# Patient Record
Sex: MALE
[De-identification: names, ages, dates, MRNs, and addresses within clinical notes are randomized per-mention and may not be internally consistent; named-entity substitution may affect disease eponyms.]

---

## 2018-05-18 ENCOUNTER — HOSPITAL ENCOUNTER (EMERGENCY)
Dept: HOSPITAL 97 - ER | Age: 33
Discharge: LEFT BEFORE DISCHARGE | End: 2018-05-18
Payer: SELF-PAY

## 2018-05-18 DIAGNOSIS — Z02.9: Primary | ICD-10-CM

## 2018-05-18 NOTE — ER
Nurse's Notes                                                                                     

 [facility]                                                                

Name: Tim Gupta                                                                             

Age: 32 yrs                                                                                       

Sex: Male                                                                                         

: 1985                                                                                   

MRN: [id number]                                                                                   

Arrival Date: 2018                                                                          

Time: 16:48                                                                                       

Account#: [id number]                                                                            

Bed Waiting                                                                                       

Private MD:                                                                                       

Diagnosis:                                                                                        

                                                                                                  

Presentation:                                                                                     

                                                                                             

16:53 Note Patient left prior to triage, stating, "I got the blood to stop, I don't think I   lk1 

      need to be here.".                                                                          

                                                                                                  

ED Course:                                                                                        

16:48 Patient arrived in ED.                                                                  sb2 

                                                                                                  

Administered Medications:                                                                         

No medications were administered                                                                  

                                                                                                  

                                                                                                  

Outcome:                                                                                          

16:53 Patient left the ED.                                                                    lk1 

                                                                                                  

Signatures:                                                                                       

Marita Toussaint RN                         RN   lk1                                                  

Tamanna Braun                               sb2                                                  

                                                                                                  

**************************************************************************************************

## 2018-12-20 ENCOUNTER — HOSPITAL ENCOUNTER (EMERGENCY)
Dept: HOSPITAL 97 - ER | Age: 33
Discharge: HOME | End: 2018-12-20
Payer: SELF-PAY

## 2018-12-20 DIAGNOSIS — I10: ICD-10-CM

## 2018-12-20 DIAGNOSIS — F10.239: Primary | ICD-10-CM

## 2018-12-20 DIAGNOSIS — K21.9: ICD-10-CM

## 2018-12-20 LAB
ALBUMIN SERPL BCP-MCNC: 4.1 G/DL (ref 3.4–5)
ALP SERPL-CCNC: 126 U/L (ref 45–117)
ALT SERPL W P-5'-P-CCNC: 62 U/L (ref 12–78)
AST SERPL W P-5'-P-CCNC: 106 U/L (ref 15–37)
BUN BLD-MCNC: 13 MG/DL (ref 7–18)
GLUCOSE SERPLBLD-MCNC: 107 MG/DL (ref 74–106)
HCT VFR BLD CALC: 47.6 % (ref 39.6–49)
LIPASE SERPL-CCNC: 131 U/L (ref 73–393)
LYMPHOCYTES # SPEC AUTO: 1.4 K/UL (ref 0.7–4.9)
MAGNESIUM SERPL-MCNC: 2.4 MG/DL (ref 1.8–2.4)
MCH RBC QN AUTO: 36 PG (ref 27–35)
MCV RBC: 100.4 FL (ref 80–100)
METHAMPHET UR QL SCN: NEGATIVE
PMV BLD: 8.8 FL (ref 7.6–11.3)
POTASSIUM SERPL-SCNC: 3.8 MMOL/L (ref 3.5–5.1)
RBC # BLD: 4.74 M/UL (ref 4.33–5.43)
THC SERPL-MCNC: NEGATIVE NG/ML
TROPONIN I: < 0.02 NG/ML (ref 0–0.04)

## 2018-12-20 PROCEDURE — 82962 GLUCOSE BLOOD TEST: CPT

## 2018-12-20 PROCEDURE — 80307 DRUG TEST PRSMV CHEM ANLYZR: CPT

## 2018-12-20 PROCEDURE — 96374 THER/PROPH/DIAG INJ IV PUSH: CPT

## 2018-12-20 PROCEDURE — 85025 COMPLETE CBC W/AUTO DIFF WBC: CPT

## 2018-12-20 PROCEDURE — 96375 TX/PRO/DX INJ NEW DRUG ADDON: CPT

## 2018-12-20 PROCEDURE — 84484 ASSAY OF TROPONIN QUANT: CPT

## 2018-12-20 PROCEDURE — 80048 BASIC METABOLIC PNL TOTAL CA: CPT

## 2018-12-20 PROCEDURE — 99285 EMERGENCY DEPT VISIT HI MDM: CPT

## 2018-12-20 PROCEDURE — 80076 HEPATIC FUNCTION PANEL: CPT

## 2018-12-20 PROCEDURE — 93005 ELECTROCARDIOGRAM TRACING: CPT

## 2018-12-20 PROCEDURE — 36415 COLL VENOUS BLD VENIPUNCTURE: CPT

## 2018-12-20 PROCEDURE — 96361 HYDRATE IV INFUSION ADD-ON: CPT

## 2018-12-20 PROCEDURE — 70450 CT HEAD/BRAIN W/O DYE: CPT

## 2018-12-20 PROCEDURE — 81003 URINALYSIS AUTO W/O SCOPE: CPT

## 2018-12-20 PROCEDURE — 83690 ASSAY OF LIPASE: CPT

## 2018-12-20 PROCEDURE — 83735 ASSAY OF MAGNESIUM: CPT

## 2018-12-20 NOTE — ER
Nurse's Notes                                                                                     

 [facility]                                                                

Name: Tim Gupta                                                                             

Age: 33 yrs                                                                                       

Sex: Male                                                                                         

: 1985                                                                                   

MRN: [id number]                                                                                   

Arrival Date: 2018                                                                          

Time: 08:36                                                                                       

Account#: [id number]                                                                            

Bed 7                                                                                             

Private MD: None, None                                                                            

Diagnosis: Alcohol dependence with withdrawal, uncomplicated                                      

                                                                                                  

Presentation:                                                                                     

                                                                                             

08:49 Presenting complaint: Patient states: N/V, headache, and leg cramps since yesterday. Pt hb  

      reports drinking 6-10 beers per day, last drink was yesterday. Transition of care:          

      patient was not received from another setting of care. Onset of symptoms was 2018. Risk Assessment: Do you want to hurt yourself or someone else? Patient            

      reports no desire to harm self or others. Care prior to arrival: None.                      

08:49 Method Of Arrival: Ambulatory                                                           hb  

08:49 Acuity: ROB 3                                                                           hb  

08:51 Initial Sepsis Screen: Does the patient meet any 2 criteria? No. Patient's initial      tw2 

      sepsis screen is negative. Does the patient have a suspected source of infection? No.       

      Patient's initial sepsis screen is negative.                                                

                                                                                                  

Historical:                                                                                       

- Allergies:                                                                                      

08:50 No Known Allergies;                                                                     tw2 

08:50 No Known Allergies;                                                                     hb  

- Home Meds:                                                                                      

08:50 ranitidine HCl 150 mg Oral cap 1 cap once daily [Active]; paroxetine HCl 20 mg Oral tab tw2 

      1 tab once daily [Active]; omeprazole 20 mg Oral cpDR 1 cap once daily [Active];            

      lisinopril-hydrochlorothiazide 10-12.5 mg Oral tab 1 tab once daily [Active];               

08:50 lisinopril-hydrochlorothiazide 10-12.5 mg Oral tab 1 tab once daily [Active];           hb  

      omeprazole 20 mg Oral cpDR 1 cap once daily [Active]; paroxetine HCl 20 mg Oral tab 1       

      tab once daily [Active]; ranitidine HCl 150 mg Oral cap 1 cap once daily [Active];          

- PMHx:                                                                                           

08:50 GERD; Hypertension;                                                                     tw2 

08:50 GERD; Hypertension;                                                                     hb  

                                                                                                  

- Immunization history:: Adult Immunizations Adult Immunizations up to date.                      

- Social history:: Smoking status: Smoking status: Patient/guardian denies using                  

  tobacco.                                                                                        

- Ebola Screening: : Patient denies travel to an Ebola-affected area in the 21 days               

  before illness onset No symptoms or risks identified at this time.                              

                                                                                                  

                                                                                                  

Screenin:49 Abuse screen: Denies threats or abuse. Nutritional screening: No deficits noted.        tw2 

      Tuberculosis screening: No symptoms or risk factors identified. Fall Risk None              

      identified.                                                                                 

                                                                                                  

Assessment:                                                                                       

08:50 General: Appears in no apparent distress. Behavior is cooperative, appropriate for age. tw2 

      Pain: Denies pain. Neuro: Level of Consciousness is awake, alert, obeys commands,           

      Oriented to person, place, time, situation. Cardiovascular: Reports diaphoresis,            

      lightheadedness, dizziness Denies chest pain, shortness of breath, Heart tones S1 S2        

      Patient's skin is warm and dry. Respiratory: Airway is patent Respiratory effort is         

      even, unlabored, Respiratory pattern is regular, symmetrical, Breath sounds are clear       

      bilaterally. GI: Abdomen is round non-distended, Bowel sounds present X 4 quads.            

      Reports nausea. : No signs and/or symptoms were reported regarding the genitourinary      

      system. EENT: No signs and/or symptoms were reported regarding the EENT system. Derm:       

      Skin is diaphoretic. Musculoskeletal: Circulation, motion, and sensation intact. Range      

      of motion: intact in all extremities.                                                       

09:50 Reassessment: Patient appears in no apparent distress at this time. No changes from     tw2 

      previously documented assessment. Patient and/or family updated on plan of care and         

      expected duration. Pain level reassessed. Patient is alert, oriented x 3, equal             

      unlabored respirations, skin warm/dry/pink.                                                 

10:22 Reassessment: Patient appears in no apparent distress at this time. No changes from     tw2 

      previously documented assessment. Patient and/or family updated on plan of care and         

      expected duration. Pain level reassessed. Patient is alert, oriented x 3, equal             

      unlabored respirations, skin warm/dry/pink.                                                 

11:30 Reassessment: Patient appears in no apparent distress at this time. Patient and/or      tw2 

      family updated on plan of care and expected duration. Pain level reassessed. Patient is     

      alert, oriented x 3, equal unlabored respirations, skin warm/dry/pink.                      

12:30 Reassessment: Patient appears in no apparent distress at this time. No changes from     tw2 

      previously documented assessment. Patient and/or family updated on plan of care and         

      expected duration. Pain level reassessed. Patient is alert, oriented x 3, equal             

      unlabored respirations, skin warm/dry/pink.                                                 

13:30 Reassessment: Patient appears in no apparent distress at this time. Patient and/or      tw2 

      family updated on plan of care and expected duration. Pain level reassessed. Patient is     

      alert, oriented x 3, equal unlabored respirations, skin warm/dry/pink.                      

14:00 Reassessment: Patient appears in no apparent distress at this time. Patient and/or      tw2 

      family updated on plan of care and expected duration. Pain level reassessed. Patient is     

      alert, oriented x 3, equal unlabored respirations, skin warm/dry/pink.                      

                                                                                                  

Vital Signs:                                                                                      

08:48  / 101; Pulse 92; Resp 18; Temp 97.2; Pulse Ox 96% on R/A; Pain 5/10;             hb  

09:21  / 90; Pulse 85; Resp 19; Pulse Ox 98% on R/A;                                    tw2 

09:34  / 95 LA Supine (auto/lg); Pulse 85; Resp 19; Pulse Ox 99% ;                      ag  

09:34  / 22 LA Sitting (auto/lg); Pulse 88; Resp 14; Pulse Ox 98% on R/A;               ag  

09:34  / 113 LA Standing (auto/lg); Pulse 103; Resp 16; Pulse Ox 100% ;                 ag  

10:22  / 95; Pulse 90; Resp 17; Pulse Ox 100% on R/A;                                   tw2 

11:30  / 90; Pulse 93; Resp 18; Pulse Ox 100% on R/A;                                   tw2 

12:30  / 86; Pulse 87; Resp 17; Pulse Ox 99% on R/A;                                    tw2 

13:30  / 89; Pulse 90; Resp 17; Pulse Ox 99% on R/A; Pain 0/10;                         tw2 

                                                                                                  

ED Course:                                                                                        

08:36 Patient arrived in ED.                                                                  sb2 

08:36 None, None is Private Physician.                                                        sb2 

08:49 Irina Carranza, RN is Primary Nurse.                                                        tw2 

08:50 Triage completed.                                                                       hb  

08:50 Bed in low position. Call light in reach. Adult w/ patient. Pulse ox on. NIBP on.       tw2 

08:50 Arm band placed on right wrist.                                                         hb  

08:51 Arm band placed on.                                                                     tw2 

08:58 Baltazar Melo PA is PHCP.                                                                cp  

08:58 Yang Naylor MD is Attending Physician.                                              cp  

09:15 EKG done, by EKG tech. reviewed by Baltazar SAMAYOA.                                       at1 

09:15 Inserted saline lock: 20 gauge in right antecubital area, using aseptic technique.      tw2 

      Blood collected.                                                                            

11:07 CT completed. Patient tolerated procedure well. Patient moved to CT via wheelchair.     [id number] 

      Patient moved back from CT.                                                                 

11:11 CT Head Brain wo Cont In Process Unspecified.                                           EDMS

14:00 No provider procedures requiring assistance completed. IV discontinued, intact,         tw2 

      bleeding controlled, No redness/swelling at site. Pressure dressing applied.                

                                                                                                  

Administered Medications:                                                                         

09:18 Drug: Zofran 4 mg Route: IVP; Site: right antecubital;                                  tw2 

10:30 Follow up: Response: No adverse reaction; Nausea is decreased                           tw2 

09:20 Drug: Pepcid 20 mg Route: IVP; Site: right antecubital;                                 tw2 

10:45 Follow up: Response: No adverse reaction                                                tw2 

09:22 Drug: NS 0.9% 1000 ml Route: IV; Rate: 1 bolus; Site: right antecubital;                tw2 

11:30 Follow up: Response: No adverse reaction; IV Status: Completed infusion; IV Intake:     tw2 

      1000ml                                                                                      

12:47 Drug: Librium - chlordiazePOXIDE 50 mg Route: PO;                                       sg  

13:30 Follow up: Response: No adverse reaction                                                tw2 

                                                                                                  

                                                                                                  

Point of Care Testing:                                                                            

      Blood Glucose:                                                                              

09:17 Blood Glucose: 105 mg/dL;                                                               tw2 

      Ranges:                                                                                     

                                                                                                  

Intake:                                                                                           

11:30 IV: 1000ml; Total: 1000ml.                                                              tw2 

                                                                                                  

Outcome:                                                                                          

12:13 Discharge ordered by MD.                                                                cp  

14:00 Patient left the ED.                                                                    tw2 

14:00 Discharged to home ambulatory.                                                          tw2 

14:00 Condition: stable                                                                           

14:00 Discharge instructions given to patient, family, Instructed on discharge instructions,      

      follow up and referral plans. medication usage, Demonstrated understanding of               

      instructions, follow-up care, medications, Prescriptions given X 1.                         

                                                                                                  

Signatures:                                                                                       

Dispatcher [employer]                           Brian Gurrola, YAYO ZEE   sg                                                   

Salma Kennedy, EKG Tech              EKG Tat1                                                  

Teresita Dee Corey, PA PA   cp                                                   

Araceli Reyna RN RN                                                      

Irina Carranza RN                          RN   tw2                                                  

Tamanna Braun                               sb2                                                  

Ifeoma Armendariz                              jg6                                                  

                                                                                                  

Corrections: (The following items were deleted from the chart)                                    

12:26 08:49 Presenting complaint: Patient states: N/V, headache, and leg cramps since         hb  

      yesterday. Pt reports drinking 6-10 beers pre day, last drink was yesterday hb              

                                                                                                  

**************************************************************************************************

## 2018-12-20 NOTE — EDPHYS
Physician Documentation                                                                           

 [facility]                                                                

Name: Tim Gupta                                                                             

Age: 33 yrs                                                                                       

Sex: Male                                                                                         

: 1985                                                                                   

MRN: [id number]                                                                                   

Arrival Date: 2018                                                                          

Time: 08:36                                                                                       

Account#: [id number]                                                                            

Bed 7                                                                                             

Private MD: None, None                                                                            

ED Physician Yang Naylor                                                                       

HPI:                                                                                              

                                                                                             

09:10 This 33 yrs old  Male presents to ER via Ambulatory with complaints of Fever,   cp  

      Vomiting.                                                                                   

09:10 The patient reports fever, not measured (subjective).                                   cp  

09:10 Associated signs and symptoms: Pertinent positives: nausea, vomiting, Pertinent         cp  

      negatives: abdominal pain, chest pain, hemoptysis, skin rash.                               

09:10 Onset: The symptoms/episode began/occurred yesterday.                                   cp  

09:10 Patient reports to heavier use of alcohol over the past month and reports consuming     cp  

      approximately 6-10 beers a day. Reports last use of alcohol was yesterday.                  

                                                                                                  

Historical:                                                                                       

- Allergies:                                                                                      

08:50 No Known Allergies;                                                                     tw2 

08:50 No Known Allergies;                                                                     hb  

- Home Meds:                                                                                      

08:50 ranitidine HCl 150 mg Oral cap 1 cap once daily [Active]; paroxetine HCl 20 mg Oral tab tw2 

      1 tab once daily [Active]; omeprazole 20 mg Oral cpDR 1 cap once daily [Active];            

      lisinopril-hydrochlorothiazide 10-12.5 mg Oral tab 1 tab once daily [Active];               

08:50 lisinopril-hydrochlorothiazide 10-12.5 mg Oral tab 1 tab once daily [Active];           hb  

      omeprazole 20 mg Oral cpDR 1 cap once daily [Active]; paroxetine HCl 20 mg Oral tab 1       

      tab once daily [Active]; ranitidine HCl 150 mg Oral cap 1 cap once daily [Active];          

- PMHx:                                                                                           

08:50 GERD; Hypertension;                                                                     tw2 

08:50 GERD; Hypertension;                                                                     hb  

                                                                                                  

- Immunization history:: Adult Immunizations Adult Immunizations up to date.                      

- Social history:: Smoking status: Smoking status: Patient/guardian denies using                  

  tobacco.                                                                                        

- Ebola Screening: : Patient denies travel to an Ebola-affected area in the 21 days               

  before illness onset No symptoms or risks identified at this time.                              

                                                                                                  

                                                                                                  

ROS:                                                                                              

09:13 Constitutional: Positive for poor PO intake, Negative for fever.                        cp  

09:13 Eyes: Negative for injury, pain, redness, and discharge.                                cp  

09:13 ENT: Negative for drainage from ear(s), ear pain, sore throat, difficulty swallowing,       

      difficulty handling secretions.                                                             

09:13 Cardiovascular: Negative for chest pain, edema, palpitations.                               

09:13 Respiratory: Negative for cough, shortness of breath, wheezing.                             

09:13 Abdomen/GI: Positive for nausea and vomiting, Negative for abdominal pain, diarrhea,        

      constipation, black/tarry stool, rectal bleeding.                                           

09:13 Back: Negative for pain at rest, pain with movement, radiated pain.                         

09:13 : Negative for urinary symptoms, testicular pain                                          

09:13 Skin: Positive for diaphoresis, Negative for cellulitis, rash.                              

09:13 Neuro: Positive for dizziness, tremor, general weakness, Negative for altered mental        

      status, headache, seizure activity, syncope.                                                

09:13 Psych: Positive for alcohol dependence, Negative for depression, drug dependence,           

      auditory hallucinations, visual hallucinations, suicide gesture, suicidal ideation.         

09:13 All other systems are negative.                                                             

                                                                                                  

Exam:                                                                                             

09:15 ECG was reviewed by the Attending Physician.                                            cp  

09:20 Constitutional: The patient appears in no acute distress, alert, awake, non-toxic, well cp  

      developed, well nourished, diaphoretic.                                                     

09:20 Head/Face:  Normocephalic, atraumatic.                                                  cp  

09:20 Eyes: Periorbital structures: appear normal, Pupils: equal, round, and reactive to          

      light and accomodation, Extraocular movements: intact throughout, Conjunctiva: normal,      

      no exudate, no injection, Sclera: no appreciated abnormality, Lids and lashes: appear       

      normal, bilaterally.                                                                        

09:20 ENT: External ear(s): are unremarkable, Ear canal(s): are normal, clear, TM's: bulging,     

      is not appreciated, bilaterally, dullness, bilaterally, erythema, is not appreciated,       

      bilaterally, Nose: is normal, Mouth: Lips: moist, Oral mucosa: pink and intact, moist,      

      Posterior pharynx: is normal, airway is patent, no erythema, no exudate, Voice: is          

      normal.                                                                                     

09:20 Neck: ROM/movement: is normal, is supple, without pain, no range of motions                 

      limitations, no meningismus, no nuchal rigidity.                                            

09:20 Chest/axilla: Inspection: normal, Palpation: is normal, no crepitus, no tenderness.         

09:20 Cardiovascular: Rate: normal, Rhythm: regular, Heart sounds: murmur, not appreciated,       

      Edema: is not appreciated, JVD: is not appreciated.                                         

09:20 Respiratory: the patient does not display signs of respiratory distress,  Respirations:     

      normal, no use of accessory muscles, no retractions, no splinting, no tachypnea,            

      labored breathing, is not present, Breath sounds: are clear throughout, no decreased        

      breath sounds, no stridor, no wheezing.                                                     

09:20 Abdomen/GI: Inspection: abdomen appears normal, Bowel sounds: active, all quadrants,        

      Palpation: abdomen is soft and non-tender, in all quadrants, rebound tenderness, is not     

      appreciated, voluntary guarding, is not appreciated, involuntary guarding, is not           

      appreciated.                                                                                

09:20 Back: pain, is absent, ROM is normal.                                                       

09:20 Skin: cellulitis, is not appreciated, no rash present.                                      

09:20 Neuro: Orientation: to person, place \T\ time. Mentation: is normal, Cerebellar function:   

      is grossly normal, Motor: moves all fours, strength is normal, Sensation: is normal,        

      Abnormal movements: resting tremor, is located in the  right hand and left hand.            

09:20 Psych: Patient has no thoughts/intents to harm self or others. Judgement / Insight is       

      normal. Delusions/hallucinations are not present.                                           

                                                                                                  

Vital Signs:                                                                                      

08:48  / 101; Pulse 92; Resp 18; Temp 97.2; Pulse Ox 96% on R/A; Pain 5/10;             hb  

09:21  / 90; Pulse 85; Resp 19; Pulse Ox 98% on R/A;                                    tw2 

09:34  / 95 LA Supine (auto/lg); Pulse 85; Resp 19; Pulse Ox 99% ;                      ag  

09:34  / 22 LA Sitting (auto/lg); Pulse 88; Resp 14; Pulse Ox 98% on R/A;               ag  

09:34  / 113 LA Standing (auto/lg); Pulse 103; Resp 16; Pulse Ox 100% ;                 ag  

10:22  / 95; Pulse 90; Resp 17; Pulse Ox 100% on R/A;                                   tw2 

11:30  / 90; Pulse 93; Resp 18; Pulse Ox 100% on R/A;                                   tw2 

12:30  / 86; Pulse 87; Resp 17; Pulse Ox 99% on R/A;                                    tw2 

13:30  / 89; Pulse 90; Resp 17; Pulse Ox 99% on R/A; Pain 0/10;                         tw2 

                                                                                                  

MDM:                                                                                              

08:58 Patient medically screened.                                                             cp  

09:30 Differential diagnosis: bronchitis, pneumonia UTI, gastroenteritis, alcohol withdrawal, cp  

      electrolyte abnormality, cardiac arrythmia.                                                 

12:10 Data reviewed: vital signs, nurses notes, lab test result(s), EKG, radiologic studies,  cp  

      CT scan, plain films.                                                                       

12:10 Test interpretation: by ED physician or midlevel provider: ECG, plain radiologic        cp  

      studies. Counseling: I had a detailed discussion with the patient and/or guardian           

      regarding: the historical points, exam findings, and any diagnostic results supporting      

      the discharge/admit diagnosis, lab results, radiology results, the need for outpatient      

      follow up, a family practitioner, to return to the emergency department if symptoms         

      worsen or persist or if there are any questions or concerns that arise at home.             

      Response to treatment: the patient's symptoms have markedly improved after treatment,       

      and as a result, I will discharge patient.                                                  

                                                                                                  

                                                                                             

09:07 Order name: Basic Metabolic Panel; Complete Time: 10:39                                   

                                                                                             

10:40 Interpretation: Normal except: ; GLUC 107.                                          

                                                                                             

09:07 Order name: CBC with Diff; Complete Time: 10:39                                           

                                                                                             

10:40 Interpretation: Normal except: .4; MCH 36.0; RDW 11.8.                             

                                                                                             

09:07 Order name: Creatinine for Radiology; Complete Time: 10:39                                

                                                                                             

09:07 Order name: Hepatic Function; Complete Time: 10:39                                        

                                                                                             

10:40 Interpretation: Normal except: ; ; GLOB 4.1; A/G 1.0.                       

                                                                                             

09:07 Order name: Lipase; Complete Time: 10:40                                                  

                                                                                             

09:07 Order name: Magnesium; Complete Time: 10:40                                               

                                                                                             

09:07 Order name: Troponin I; Complete Time: 10:40                                              

                                                                                             

09:07 Order name: UDS; Complete Time: 10:51                                                     

                                                                                             

10:51 Interpretation: Reviewed.                                                                 

                                                                                             

10:02 Order name: Urine Dipstick--Ancillary (enter results); Complete Time: 13:04               

                                                                                             

10:50 Order name: CT Head Brain wo Cont; Complete Time: 11:37                                 cp  

                                                                                             

11:37 Interpretation: Report reviewed.                                                        cp  

                                                                                             

09:05 Order name: EKG; Complete Time: 09:05                                                   tw2 

                                                                                             

09:05 Order name: EKG - Nurse/Tech; Complete Time: 09:09                                      tw2 

                                                                                             

09:07 Order name: Orthostatics; Complete Time: 09:28                                          cp  

                                                                                             

09:07 Order name: IV Saline Lock; Complete Time: :                                        cp  

                                                                                             

09:07 Order name: Labs collected and sent; Complete Time: :                               cp  

                                                                                                  

EC:15 Rate is 89 beats/min. Rhythm is regular. VT interval is normal. QRS interval is normal. cp  

      QT interval is normal. Interpreted by me. Reviewed by me.                                   

                                                                                                  

Administered Medications:                                                                         

:18 Drug: Zofran 4 mg Route: IVP; Site: right antecubital;                                  tw2 

10:30 Follow up: Response: No adverse reaction; Nausea is decreased                           tw2 

09:20 Drug: Pepcid 20 mg Route: IVP; Site: right antecubital;                                 tw2 

10:45 Follow up: Response: No adverse reaction                                                tw2 

09:22 Drug: NS 0.9% 1000 ml Route: IV; Rate: 1 bolus; Site: right antecubital;                tw2 

11:30 Follow up: Response: No adverse reaction; IV Status: Completed infusion; IV Intake:     tw2 

      1000ml                                                                                      

12:47 Drug: Librium - chlordiazePOXIDE 50 mg Route: PO;                                       sg  

13:30 Follow up: Response: No adverse reaction                                                tw2 

                                                                                                  

                                                                                                  

Point of Care Testing:                                                                            

      Blood Glucose:                                                                              

:17 Blood Glucose: 105 mg/dL;                                                               tw2 

      Ranges:                                                                                     

      Critical Glucose Levels:Adult <50 mg/dl or >400 mg/dl  <40 mg/dl or >180 mg/dl       

Disposition:                                                                                      

18 12:13 Discharged to Home. Impression: Alcohol dependence with withdrawal,                

  uncomplicated.                                                                                  

- Condition is Stable.                                                                            

- Discharge Instructions: Finding Treatment for Addiction, Alcohol Withdrawal, Alcohol            

  Use Disorder, Alcohol Abuse and Nutrition.                                                      

- Prescriptions for chlordiazepoxide HCl 25 mg Oral capsule - take 1 capsule by ORAL              

  route 4 times per day As needed for withdrawal from alcohol; 30 capsule.                        

- Medication Reconciliation Form, Thank You Letter, Antibiotic Education, Prescription            

  Opioid Use, Work release form form.                                                             

- Follow up: Private Physician; When: 1 - 2 days; Reason: Recheck today's complaints.             

- Problem is new.                                                                                 

- Symptoms have improved.                                                                         

                                                                                                  

                                                                                                  

                                                                                                  

Signatures:                                                                                       

Dispatcher [employer]                           EDBrian Cox RN                         RN   Baltazar Chaney PA PA   cp                                                   

Araceli Reyna RN                     RN                                                      

Irina Carranza RN                          RN   tw2                                                  

                                                                                                  

Corrections: (The following items were deleted from the chart)                                    

14:00 12:13 2018 12:13 Discharged to Home. Impression: Alcohol dependence with          tw2 

      withdrawal, uncomplicated. Condition is Stable. Discharge Instructions: Alcohol             

      Withdrawal, Alcohol Abuse and Nutrition. Prescriptions for chlordiazepoxide HCl 25 mg       

      Oral capsule - take 1 capsule by ORAL route 4 times per day As needed for withdrawal        

      from alcohol; 30 capsule. and Forms are Work release form, Medication Reconciliation        

      Form, Thank You Letter, Antibiotic Education, Prescription Opioid Use. Follow up:           

      Private Physician; When: 1 - 2 days; Reason: Recheck today's complaints. Problem is         

      new. Symptoms have improved. cp                                                             

                                                                                                  

**************************************************************************************************

## 2018-12-20 NOTE — RAD REPORT
EXAM DESCRIPTION:  CT - Head Brain Wo Cont - 12/20/2018 11:10 am

 

CLINICAL HISTORY:  Headache

 

COMPARISON:  None.

 

TECHNIQUE:  Computed axial tomography of the head was obtained. IV contrast was not requested.

 

All CT scans are performed using dose optimization technique as appropriate and may include automated
 exposure control or mA/KV adjustment according to patient size.

 

FINDINGS:  An intracranial  bleed is not seen .

 

The ventricles are normal in caliber.

 

No extra-axial fluid collection is noted.

 

Fluid within the sinuses/ mastoids is not seen.

 

IMPRESSION:  No acute intracranial abnormality is seen. If patient's symptoms persist  MRI of the bra
in would be recommended.

## 2018-12-21 NOTE — EKG
Test Date:    2018-12-20               Test Time:    09:08:55

Technician:   DARBY                                     

                                                     

MEASUREMENT RESULTS:                                       

Intervals:                                           

Rate:         89                                     

WA:           152                                    

QRSD:         86                                     

QT:           380                                    

QTc:          462                                    

Axis:                                                

P:            20                                     

WA:           152                                    

QRS:          67                                     

T:            50                                     

                                                     

INTERPRETIVE STATEMENTS:                                       

                                                     

Normal sinus rhythm

Normal ECG

Compared to ECG 06/15/2017 16:29:20

Sinus tachycardia no longer present



Electronically Signed On 12-21-18 06:53:23 CST by Alexander Mane

## 2019-06-07 ENCOUNTER — HOSPITAL ENCOUNTER (EMERGENCY)
Dept: HOSPITAL 97 - ER | Age: 34
Discharge: HOME | End: 2019-06-07
Payer: SELF-PAY

## 2019-06-07 DIAGNOSIS — F10.239: Primary | ICD-10-CM

## 2019-06-07 DIAGNOSIS — I10: ICD-10-CM

## 2019-06-07 LAB
ALBUMIN SERPL BCP-MCNC: 4.1 G/DL (ref 3.4–5)
ALP SERPL-CCNC: 185 U/L (ref 45–117)
ALT SERPL W P-5'-P-CCNC: 61 U/L (ref 12–78)
AST SERPL W P-5'-P-CCNC: 167 U/L (ref 15–37)
BLD SMEAR INTERP: (no result)
BUN BLD-MCNC: 13 MG/DL (ref 7–18)
CKMB CREATINE KINASE MB: 2.4 NG/ML (ref 0.3–3.6)
GLUCOSE SERPLBLD-MCNC: 108 MG/DL (ref 74–106)
HCT VFR BLD CALC: 46.1 % (ref 39.6–49)
INR BLD: 1.19
LYMPHOCYTES # SPEC AUTO: 1.2 K/UL (ref 0.7–4.9)
MAGNESIUM SERPL-MCNC: 1.8 MG/DL (ref 1.8–2.4)
METHAMPHET UR QL SCN: NEGATIVE
MORPHOLOGY BLD-IMP: (no result)
NT-PROBNP SERPL-MCNC: 122 PG/ML (ref ?–125)
PMV BLD: 10 FL (ref 7.6–11.3)
POTASSIUM SERPL-SCNC: 3.2 MMOL/L (ref 3.5–5.1)
RBC # BLD: 4.61 M/UL (ref 4.33–5.43)
THC SERPL-MCNC: NEGATIVE NG/ML
TROPONIN (EMERG DEPT USE ONLY): < 0.02 NG/ML (ref 0–0.04)

## 2019-06-07 PROCEDURE — 83735 ASSAY OF MAGNESIUM: CPT

## 2019-06-07 PROCEDURE — 84484 ASSAY OF TROPONIN QUANT: CPT

## 2019-06-07 PROCEDURE — 81003 URINALYSIS AUTO W/O SCOPE: CPT

## 2019-06-07 PROCEDURE — 85025 COMPLETE CBC W/AUTO DIFF WBC: CPT

## 2019-06-07 PROCEDURE — 80076 HEPATIC FUNCTION PANEL: CPT

## 2019-06-07 PROCEDURE — 99285 EMERGENCY DEPT VISIT HI MDM: CPT

## 2019-06-07 PROCEDURE — 80320 DRUG SCREEN QUANTALCOHOLS: CPT

## 2019-06-07 PROCEDURE — 96365 THER/PROPH/DIAG IV INF INIT: CPT

## 2019-06-07 PROCEDURE — 82550 ASSAY OF CK (CPK): CPT

## 2019-06-07 PROCEDURE — 80307 DRUG TEST PRSMV CHEM ANLYZR: CPT

## 2019-06-07 PROCEDURE — 82553 CREATINE MB FRACTION: CPT

## 2019-06-07 PROCEDURE — 93005 ELECTROCARDIOGRAM TRACING: CPT

## 2019-06-07 PROCEDURE — 80048 BASIC METABOLIC PNL TOTAL CA: CPT

## 2019-06-07 PROCEDURE — 96366 THER/PROPH/DIAG IV INF ADDON: CPT

## 2019-06-07 PROCEDURE — 96361 HYDRATE IV INFUSION ADD-ON: CPT

## 2019-06-07 PROCEDURE — 83880 ASSAY OF NATRIURETIC PEPTIDE: CPT

## 2019-06-07 PROCEDURE — 96375 TX/PRO/DX INJ NEW DRUG ADDON: CPT

## 2019-06-07 PROCEDURE — 36415 COLL VENOUS BLD VENIPUNCTURE: CPT

## 2019-06-07 PROCEDURE — 85610 PROTHROMBIN TIME: CPT

## 2019-06-07 PROCEDURE — 76705 ECHO EXAM OF ABDOMEN: CPT

## 2019-06-07 NOTE — ER
Nurse's Notes                                                                                     

 [facility]                                                                 

Name: Tim Gupta                                                                             

Age: 33 yrs                                                                                       

Sex: Male                                                                                         

: 1985                                                                                   

MRN: [id number]                                                                                   

Arrival Date: 2019                                                                          

Time: 16:08                                                                                       

Account#: [id number]                                                                            

Bed 17                                                                                            

Private MD:                                                                                       

Diagnosis: Alcohol abuse;Alcohol dependence with withdrawal                                       

                                                                                                  

Presentation:                                                                                     

                                                                                             

16:25 Presenting complaint: Patient states: cramping to all 4 extremities that began 2 week   aa5 

      ago. Pt also reports inability to sleep, decreased urination, and shaking to arms. Pt       

      states "I also wake up feeling dizzy". 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 Transition of care:     

      patient was not received from another setting of care. Onset of symptoms was 2019.     

      Risk Assessment: Do you want to hurt yourself or someone else? Patient reports no           

      desire to harm self or others. Initial Sepsis Screen: Does the patient meet any 2           

      criteria? No. Patient's initial sepsis screen is negative. Does the patient have a          

      suspected source of infection? No. Patient's initial sepsis screen is negative. Care        

      prior to arrival: None.                                                                     

16:25 Method Of Arrival: Ambulatory                                                           aa5 

16:25 Acuity: RBO 3                                                                           aa5 

                                                                                                  

Historical:                                                                                       

- Allergies:                                                                                      

16:27 No Known Allergies;                                                                     aa5 

- Home Meds:                                                                                      

16:27 None [Active];                                                                          aa5 

- PMHx:                                                                                           

16:27 Hypertension;                                                                           aa5 

- PSHx:                                                                                           

16:27 left index finger;                                                                      aa5 

                                                                                                  

- Immunization history:: Adult Immunizations unknown.                                             

- Social history:: Smoking status: Patient/guardian denies using tobacco.                         

- Ebola Screening: : No symptoms or risks identified at this time.                                

                                                                                                  

                                                                                                  

Screenin:50 Abuse screen: Denies threats or abuse. Nutritional screening: No deficits noted.        em  

      Tuberculosis screening: No symptoms or risk factors identified. Fall Risk None              

      identified.                                                                                 

                                                                                                  

Assessment:                                                                                       

16:50 General: Appears in no apparent distress. comfortable, Behavior is calm, cooperative,   em  

      Reports daily drinker, reports drinking 5 beers a day, pt shaking, reports tingling on      

      abdomen, arms, and legs Denies fever. Pain: Complains of pain in abdomen. Neuro: Level      

      of Consciousness is awake, alert, obeys commands, Oriented to person, place, time,          

      situation. Cardiovascular: Capillary refill < 3 seconds Patient's skin is warm and dry.     

      Respiratory: Airway is patent Respiratory effort is even, unlabored, Respiratory            

      pattern is regular, symmetrical. GI: Abdomen is flat, Bowel sounds present X 4 quads.       

      Abd is soft and non tender X 4 quads. Reports nausea, vomiting. : Urine is tea color.     

      Derm: Skin is intact, is healthy with good turgor, Skin is pink, warm \T\ dry.              

      Musculoskeletal: Capillary refill < 3 seconds, Range of motion: intact in all               

      extremities.                                                                                

16:55 General: The previous assessment is accurate, call light remains within reach..         ss  

17:50 Reassessment: Patient is alert, oriented x 3, equal unlabored respirations, skin        em  

      warm/dry/pink. Patient states symptoms have improved.                                       

18:46 Reassessment: Patient appears in no apparent distress at this time. Patient and/or      em  

      family updated on plan of care and expected duration. Pain level reassessed. reports        

      feeling better Patient states symptoms have improved.                                       

18:48 Reassessment: ultrasound at bedside.                                                    em  

19:00 General: Appears in no apparent distress. comfortable, Behavior is calm, cooperative,   rr5 

      appropriate for age, mild tremors noted..                                                   

19:00 Reassessment: ongoing IV infusion. Pain: Denies pain. Neuro: Level of Consciousness is  rr5 

      awake, alert, obeys commands, Oriented to person, place, time, situation, Appropriate       

      for age. Cardiovascular: Capillary refill < 3 seconds Patient's skin is warm and dry.       

      Respiratory: Airway is patent Respiratory effort is even, unlabored, Respiratory            

      pattern is regular, symmetrical. GI: Abdomen is flat. : No signs and/or symptoms were     

      reported regarding the genitourinary system. EENT: No signs and/or symptoms were            

      reported regarding the EENT system. Derm: Skin is intact, is healthy with good turgor,      

      Skin is pink, warm \T\ dry. Musculoskeletal: Capillary refill < 3 seconds, Range of         

      motion: intact in all extremities.                                                          

20:05 Reassessment: Patient appears in no apparent distress at this time. Patient is alert,   rr5 

      oriented x 3, equal unlabored respirations, skin warm/dry/pink. Ed provider re examined     

      the patient. for discharge after the banana bag infusion.                                   

21:11 Reassessment: Patient appears in no apparent distress at this time. Patient is alert,   rr5 

      oriented x 3, equal unlabored respirations, skin warm/dry/pink. discharge instruction       

      given and explained without complaints made. vitally stable. Patient denies pain at         

      this time. Patient states feeling better. Patient states symptoms have improved.            

                                                                                                  

Vital Signs:                                                                                      

16:29  / 96; Pulse 112; Resp 18 S; Temp 98.2(O); Pulse Ox 100% on R/A; Weight 90.72 kg  aa5 

      (R); Height 5 ft. 2 in. (157.48 cm) (R); Pain 5/10;                                         

17:11  / 102; Pulse 102; Resp 17; Pulse Ox 100% on R/A; Pain 0/10;                      em  

18:00  / 91; Pulse 103; Resp 18; Pulse Ox 99% on R/A; Pain 0/10;                        em  

19:00  / 75; Pulse 98; Resp 17; Temp 98.4; Pulse Ox 99% on R/A;                         rr5 

20:00  / 81; Pulse 95; Resp 17; Temp 98.5; Pulse Ox 100% ; Pain 0/10;                   rr5 

21:00  / 80; Pulse 88; Resp 17; Temp 98.5; Pulse Ox 99% ; Pain 0/10;                    rr5 

16:29 Body Mass Index 36.58 (90.72 kg, 157.48 cm)                                             aa5 

                                                                                                  

ED Course:                                                                                        

16:08 Patient arrived in ED.                                                                  rg4 

16:25 Arm band placed on.                                                                     aa5 

16:27 Triage completed.                                                                       aa5 

16:27 Dylan Huerta LVN is Primary Nurse.                                                     em  

16:29 Baltazar Melo PA is PHCP.                                                                cp  

16:29 Derick Oviedo MD is Attending Physician.                                                cp  

16:50 Patient has correct armband on for positive identification. Bed in low position. Call   em  

      light in reach. Cardiac monitor on. Pulse ox on. NIBP on.                                   

16:55 Initial lab(s) drawn, by me, sent to lab. Inserted saline lock: 20 gauge in right       em  

      antecubital area, using aseptic technique. Blood collected.                                 

17:28 EKG done, by EKG tech. reviewed by Baltazar SAMAYOA.                                       sm3 

19:00 PHCP role handed off by Baltazar Melo PA                                                 pm1 

19:00 Teodoro Garcia NP is PHCP.                                                           pm1 

19:08 US Abdomen Limited: RUQ/epigastric area In Process Unspecified.                         EDMS

21:10 No provider procedures requiring assistance completed. IV discontinued, intact,         rr5 

      bleeding controlled, No redness/swelling at site. Pressure dressing applied.                

                                                                                                  

Administered Medications:                                                                         

16:55 Drug: NS 0.9% 1000 ml Route: IV; Rate: 1 bolus; Site: right antecubital;                em  

18:59 Follow up: IV Status: Completed infusion; IV Intake: 1000ml                             em  

17:30 Drug: Banana Bag - (NS 0.9% 1000 ml, foLIC Acid 1 mg, Thiamine 100 mg, Multivitamin 1   em  

      amp) Route: IV; Rate: 250 ml/hr; Site: right antecubital;                                   

21:00 Follow up: Response: No adverse reaction; IV Status: Completed infusion; IV Intake:     rr5 

      1000ml                                                                                      

17:54 Drug: Ativan 0.5 mg Route: IVP; Site: right antecubital;                                ss  

18:20 Follow up: Response: No adverse reaction; Marked relief of symptoms                     em  

18:36 Drug: Potassium Effervescent Tablet 50 mEq Route: PO;                                   em  

19:01 Follow up: Response: No adverse reaction                                                em  

18:36 Drug: Magnesium Sulfate 1 grams Route: IVPB; Infused Over: 30 mins; Site: right         em  

      antecubital;                                                                                

19:20 Follow up: Response: No adverse reaction; IV Status: Completed infusion; IV Intake:     rr5 

      100ml                                                                                       

18:41 Drug: Ativan 0.5 mg Route: IVP; Site: right antecubital;                                ss  

20:00 Follow up: Response: No adverse reaction                                                rr5 

                                                                                                  

                                                                                                  

Intake:                                                                                           

18:59 IV: 1000ml; Total: 1000ml.                                                              em  

19:20 IV: 100ml; Total: 1100ml.                                                               rr5 

21:00 IV: 1000ml; Total: 2100ml.                                                              rr5 

                                                                                                  

Outcome:                                                                                          

19:55 Discharge ordered by MD.                                                                pm1 

21:10 Discharged to home ambulatory.                                                          rr5 

21:10 Condition: stable                                                                           

21:10 Discharge instructions given to patient, Instructed on discharge instructions, follow       

      up and referral plans. medication usage, Demonstrated understanding of instructions,        

      follow-up care, medications, Prescriptions given X 1.                                       

21:12 Patient left the ED.                                                                    rr5 

                                                                                                  

Signatures:                                                                                       

Dispatcher [employer]                           EDMS                                                 

Dylan Huerta, CHADN                       LVN  em                                                   

Kristi Thompson RN                     RN   aa5                                                  

Dara Diamond RN                      RN   ss                                                   

Baltazar Melo, PA                         PA   Teodoro Fermin, NP                    NP   pm1                                                  

Luciana Armendariz                                 rg4                                                  

Dari Wilkinson                              sm3                                                  

Shaheen Pelaez, RN                      RN   rr5                                                  

                                                                                                  

**************************************************************************************************

## 2019-06-07 NOTE — EDPHYS
Physician Documentation                                                                           

 [facility]                                                                 

Name: Tim Gupta                                                                             

Age: 33 yrs                                                                                       

Sex: Male                                                                                         

: 1985                                                                                   

MRN: [id number]                                                                                   

Arrival Date: 2019                                                                          

Time: 16:08                                                                                       

Account#: [id number]                                                                            

Bed 17                                                                                            

Private MD:                                                                                       

ED Physician Derick Oviedo                                                                         

HPI:                                                                                              

                                                                                             

16:45 This 33 yrs old  Male presents to ER via Ambulatory with complaints of Shaking, cp  

      Cramping all over.                                                                          

18:34 generalized cramping times 4 weeks. Severity of symptoms: in the emergency department   cp  

      the symptoms are unchanged despite home interventions.                                      

18:35 Patient reports generalized cramping times 2 weeks, shaking started this morning.       cp  

      Patient admits to daily drinking of approximately 6-7 beers daily for the past month        

      with an increase in weekly alcohol intake for the past several months. Patient reports      

      he stopped drinking today and wants to quit. Denies use of illegal drugs.                   

                                                                                                  

Historical:                                                                                       

- Allergies:                                                                                      

16:27 No Known Allergies;                                                                     aa5 

- Home Meds:                                                                                      

16:27 None [Active];                                                                          aa5 

- PMHx:                                                                                           

16:27 Hypertension;                                                                           aa5 

- PSHx:                                                                                           

16:27 left index finger;                                                                      aa5 

                                                                                                  

- Immunization history:: Adult Immunizations unknown.                                             

- Social history:: Smoking status: Patient/guardian denies using tobacco.                         

- Ebola Screening: : No symptoms or risks identified at this time.                                

                                                                                                  

                                                                                                  

ROS:                                                                                              

16:50 Constitutional: Negative for body aches, fever, poor PO intake.                         cp  

16:50 Eyes: Negative for injury, pain, redness, and discharge.                                cp  

16:50 ENT: Negative for drainage from ear(s), ear pain, sore throat, difficulty swallowing,       

      difficulty handling secretions.                                                             

16:50 Cardiovascular: Negative for chest pain, edema, palpitations.                               

16:50 Respiratory: Negative for cough, shortness of breath, wheezing.                             

16:50 Abdomen/GI: Negative for abdominal pain, nausea, vomiting, and diarrhea, constipation,      

      black/tarry stool, rectal bleeding.                                                         

16:50 : Positive for small amounts, Negative for flank pain, burning with urination,            

      testicular pain                                                                             

16:50 Skin: Negative for cellulitis, rash.                                                        

16:50 Neuro: Positive for dizziness, tremor, Negative for altered mental status, headache,        

      seizure activity, syncope, weakness.                                                        

16:50 All other systems are negative.                                                             

                                                                                                  

Exam:                                                                                             

17:00 Constitutional: The patient appears in no acute distress, alert, awake,                 cp  

      non-diaphoretic, non-toxic, well developed, well nourished.                                 

17:00 Head/Face:  Normocephalic, atraumatic. Eyes:  Pupils equal round and reactive to light, cp  

      extra-ocular motions intact.  Lids and lashes normal.  Conjunctiva and sclera are           

      non-icteric and not injected.  Cornea within normal limits.  Periorbital areas with no      

      swelling, redness, or edema. ENT:  Nares patent. No nasal discharge, no septal              

      abnormalities noted.  Tympanic membranes are normal and external auditory canals are        

      clear.  Oropharynx with no redness, swelling, or masses, exudates, or evidence of           

      obstruction, uvula midline.  Mucous membranes moist.                                        

17:00 Neck: ROM/movement: is normal, is supple, without pain, no range of motions                 

      limitations, no meningismus, no nuchal rigidity.                                            

17:00 Chest/axilla: Inspection: normal, Palpation: is normal, no crepitus, no tenderness.         

17:00 Cardiovascular: Rate: tachycardic, Rhythm: regular, Edema: is not appreciated, JVD: is      

      not appreciated.                                                                            

17:00 Respiratory: the patient does not display signs of respiratory distress,  Respirations:     

      normal, no use of accessory muscles, no retractions, no splinting, no tachypnea,            

      labored breathing, is not present, Breath sounds: are clear throughout, no decreased        

      breath sounds, no stridor, no wheezing.                                                     

17:00 Abdomen/GI: Inspection: abdomen appears normal, Bowel sounds: active, all quadrants,        

      Palpation: abdomen is soft and non-tender, in all quadrants, involuntary guarding, is       

      not appreciated.                                                                            

17:00 Back: pain, is absent, ROM is normal.                                                       

17:00 Skin: no rash present.                                                                      

17:00 Neuro: Orientation: to person, place \T\ time. Mentation: is normal, Motor: moves all       

      fours, strength is normal, Abnormal movements: resting tremor, is located in the  right     

      arm and left arm.                                                                           

17:28 ECG was reviewed by the Attending Physician.                                            cp  

                                                                                                  

Vital Signs:                                                                                      

16:29  / 96; Pulse 112; Resp 18 S; Temp 98.2(O); Pulse Ox 100% on R/A; Weight 90.72 kg  aa5 

      (R); Height 5 ft. 2 in. (157.48 cm) (R); Pain 5/10;                                         

17:11  / 102; Pulse 102; Resp 17; Pulse Ox 100% on R/A; Pain 0/10;                      em  

18:00  / 91; Pulse 103; Resp 18; Pulse Ox 99% on R/A; Pain 0/10;                        em  

19:00  / 75; Pulse 98; Resp 17; Temp 98.4; Pulse Ox 99% on R/A;                         rr5 

20:00  / 81; Pulse 95; Resp 17; Temp 98.5; Pulse Ox 100% ; Pain 0/10;                   rr5 

21:00  / 80; Pulse 88; Resp 17; Temp 98.5; Pulse Ox 99% ; Pain 0/10;                    rr5 

16:29 Body Mass Index 36.58 (90.72 kg, 157.48 cm)                                             aa5 

                                                                                                  

MDM:                                                                                              

16:30 Patient medically screened.                                                             cp  

17:00 Differential Diagnosis dehydration, electrolyte abnormality, cardiac arrythmia, alcohol cp  

      withdrawal, illegal drug use.                                                               

19:53 Data reviewed: vital signs. Data interpreted: Pulse oximetry: on room air is 99 %.      pm1 

      Interpretation: normal. Counseling: I had a detailed discussion with the patient and/or     

      guardian regarding: the historical points, exam findings, and any diagnostic results        

      supporting the discharge/admit diagnosis, lab results, radiology results, the need for      

      outpatient follow up, to return to the emergency department if symptoms worsen or           

      persist or if there are any questions or concerns that arise at home.                       

                                                                                                  

                                                                                             

16:41 Order name: Basic Metabolic Panel; Complete Time: 18:00                                 cp  

                                                                                             

18:00 Interpretation: Normal except: ; K 3.2; CL 92; GLUC 108; CRE 1.66; GFR 47.        cp  

                                                                                             

16:41 Order name: CBC with Diff; Complete Time: 20:19                                         cp  

                                                                                             

18:00 Interpretation: Normal except: .1; PLT 97; FRANSISCA% 77.3; LYM% 13.6.                 cp  

                                                                                             

16:41 Order name: LFT's; Complete Time: 18:00                                                 cp  

                                                                                             

18:00 Interpretation: Normal except: ; ; BILIT 3.5; BILID 1.2; TP 8.5; GLOB     cp  

      4.4; A/G 0.9.                                                                               

                                                                                             

16:41 Order name: Magnesium; Complete Time: 18:00                                             cp  

/07                                                                                             

16:41 Order name: NT PRO-BNP; Complete Time: 18:00                                            cp  

/                                                                                             

16:41 Order name: PT-INR; Complete Time: 18:00                                                cp  

/                                                                                             

16:41 Order name: Troponin (emerg Dept Use Only); Complete Time: 18:00                        cp  

/                                                                                             

16:41 Order name: Ckmb; Complete Time: 18:00                                                  cp  

/                                                                                             

16:41 Order name: CK; Complete Time: 18:00                                                    cp  

/                                                                                             

16:41 Order name: UDS; Complete Time: 18:00                                                   cp  

/07                                                                                             

17:08 Order name: Urine Dipstick--Ancillary (enter results); Complete Time: 18:00             eb  

                                                                                             

18:03 Order name: ETOH Level; Complete Time: 19:27                                            cp  

                                                                                             

18:03 Order name: US Abdomen Limited: RUQ/epigastric area; Complete Time: 19:49               cp  

07                                                                                             

20:14 Order name: CBC Smear Scan; Complete Time: 20:19                                        EDMS

                                                                                             

16:41 Order name: EKG; Complete Time: 16:42                                                   cp  

07                                                                                             

16:41 Order name: Cardiac monitoring; Complete Time: 17:06                                    cp  

                                                                                             

16:41 Order name: EKG - Nurse/Tech; Complete Time: 17:06                                      cp  

                                                                                             

16:41 Order name: IV Saline Lock; Complete Time: 17:06                                        cp  

07                                                                                             

16:41 Order name: Labs collected and sent; Complete Time: 17:06                               cp  

                                                                                             

16:41 Order name: O2 Per Protocol; Complete Time: 17:06                                       cp  

                                                                                             

16:41 Order name: O2 Sat Monitoring; Complete Time: 17:06                                     cp  

                                                                                                  

EC:28 Rate is 101 beats/min. Rhythm is regular. OR interval is normal. QRS interval is        cp  

      normal. QT interval is normal. Interpreted by me. Reviewed by me.                           

                                                                                                  

Administered Medications:                                                                         

16:55 Drug: NS 0.9% 1000 ml Route: IV; Rate: 1 bolus; Site: right antecubital;                em  

18:59 Follow up: IV Status: Completed infusion; IV Intake: 1000ml                             em  

17:30 Drug: Banana Bag - (NS 0.9% 1000 ml, foLIC Acid 1 mg, Thiamine 100 mg, Multivitamin 1   em  

      amp) Route: IV; Rate: 250 ml/hr; Site: right antecubital;                                   

21:00 Follow up: Response: No adverse reaction; IV Status: Completed infusion; IV Intake:     rr5 

      1000ml                                                                                      

17:54 Drug: Ativan 0.5 mg Route: IVP; Site: right antecubital;                                ss  

18:20 Follow up: Response: No adverse reaction; Marked relief of symptoms                     em  

18:36 Drug: Potassium Effervescent Tablet 50 mEq Route: PO;                                   em  

19:01 Follow up: Response: No adverse reaction                                                em  

18:36 Drug: Magnesium Sulfate 1 grams Route: IVPB; Infused Over: 30 mins; Site: right         em  

      antecubital;                                                                                

19:20 Follow up: Response: No adverse reaction; IV Status: Completed infusion; IV Intake:     rr5 

      100ml                                                                                       

18:41 Drug: Ativan 0.5 mg Route: IVP; Site: right antecubital;                                ss  

20:00 Follow up: Response: No adverse reaction                                                rr5 

                                                                                                  

                                                                                                  

Disposition:                                                                                      

                                                                                             

07:31 Co-signature as Attending Physician, Derick Oviedo MD.                                    rn  

                                                                                                  

Disposition:                                                                                      

19 19:55 Discharged to Home. Impression: Alcohol abuse, Alcohol dependence with             

  withdrawal.                                                                                     

- Condition is Stable.                                                                            

- Discharge Instructions: Alcohol Withdrawal, Alcohol Abuse and Nutrition, Alcoholic              

  Liver Disease, Finding Treatment for Addiction.                                                 

- Prescriptions for chlordiazepoxide HCl 25 mg Oral capsule - take 1 capsule by ORAL              

  route 4 times per day As needed; 30 capsule.                                                    

- Medication Reconciliation Form, Thank You Letter, Antibiotic Education, Prescription            

  Opioid Use form.                                                                                

- Follow up: Emergency Department; When: As needed; Reason: Worsening of condition.               

  Follow up: Private Physician; When: 2 - 3 days; Reason: Recheck today's complaints,             

  Continuance of care, Re-evaluation by your physician.                                           

- Problem is new.                                                                                 

- Symptoms have improved.                                                                         

                                                                                                  

                                                                                                  

                                                                                                  

Signatures:                                                                                       

Dispatcher [employer]                           EDMS                                                 

Dylan Huerta, LVN                       LVN  Derick Meneses MD MD rn Calderon, Audri, RN                     RN   aa5                                                  

Dara Diamond RN RN ss Page, Corey, PA PA   cp                                                   

Teodoro Garcia, NP                    NP   pm1                                                  

Shaheen Pelaez, RN                      RN   rr5                                                  

                                                                                                  

Corrections: (The following items were deleted from the chart)                                    

                                                                                             

18:42  17:00 Constitutional: The patient appears in no acute distress, alert, awake,     cp  

      non-diaphoretic, non-toxic, well developed, well nourished, cp                              

                                                                                             

18:42  17:00 Head/Face: Normocephalic, atraumatic. Eyes: Pupils equal round and reactive cp  

      to light, extra-ocular motions intact. Lids and lashes normal. Conjunctiva and sclera       

      are non-icteric and not injected. Cornea within normal limits. Periorbital areas with       

      no swelling, redness, or edema. ENT: Nares patent. No nasal discharge, no septal            

      abnormalities noted. Tympanic membranes are normal and external auditory canals are         

      clear. Oropharynx with no redness, swelling, or masses, exudates, or evidence of            

      obstruction, uvula midline. Mucous membranes moist. Chest/axilla: Normal chest wall         

      appearance and motion. Nontender with no deformity. No lesions are appreciated. cp          

                                                                                             

18:42  17:00 Cardiovascular: Rate: tachycardic, Rhythm: regular, Heart sounds: murmur,   cp  

      not appreciated, Edema: is not appreciated, JVD: is not appreciated, cp                     

                                                                                             

18:42  17:00 Respiratory: the patient does not display signs of respiratory distress,    cp  

      Respirations: normal, no use of accessory muscles, no retractions, no splinting, no         

      tachypnea, labored breathing, is not present, Breath sounds: are clear throughout, no       

      decreased breath sounds, no stridor, no wheezing, cp                                        

                                                                                             

18:42  17:00 Abdomen/GI: Inspection: abdomen appears normal, Bowel sounds: active, all   cp  

      quadrants, Palpation: abdomen is soft and non-tender, in all quadrants, involuntary         

      guarding, is not appreciated, cp                                                            

                                                                                             

18:42  17:00 Skin: no rash present. cp                                                   cp  

                                                                                             

18:42  17:00 Neuro: Orientation: to person, place \T\ time. Mentation: is normal,          cp

      Cerebellar function: is grossly normal, Motor: moves all fours, strength is normal,         

      Abnormal movements: resting tremor, is located in the right arm and left arm, cp            

                                                                                             

21:12 19:55 2019 19:55 Discharged to Home. Impression: Alcohol abuse; Alcohol           rr5 

      dependence with withdrawal. Condition is Stable. Discharge Instructions: Alcohol            

      Withdrawal, Alcohol Abuse and Nutrition, Alcoholic Liver Disease. Prescriptions for         

      chlordiazepoxide HCl 25 mg Oral capsule - take 1 capsule by ORAL route 4 times per day      

      As needed; 30 capsule. and Forms are Medication Reconciliation Form, Thank You Letter,      

      Antibiotic Education, Prescription Opioid Use. Follow up: Emergency Department; When:       

      As needed; Reason: Worsening of condition. Follow up: Private Physician; When: 2 - 3        

      days; Reason: Recheck today's complaints, Continuance of care, Re-evaluation by your        

      physician. Problem is new. Symptoms have improved. pm1                                      

                                                                                                  

**************************************************************************************************

## 2019-06-07 NOTE — RAD REPORT
EXAM DESCRIPTION:  US - Abdomen Exam Limited - 6/7/2019 7:04 pm

 

CLINICAL HISTORY:  Abdominal pain.

 

COMPARISON:  None.

 

FINDINGS:  Borderline gallbladder distention

 

Gallbladder wall is not thickened. A gallstone is not seen.

 

Fatty liver

 

The biliary tree is normal caliber.

 

IMPRESSION:  Borderline gallbladder distention. No gallstones seen

## 2019-06-08 NOTE — EKG
Test Date:    2019-06-07               Test Time:    17:26:44

Technician:   JASON                                     

                                                     

MEASUREMENT RESULTS:                                       

Intervals:                                           

Rate:         101                                    

TN:           158                                    

QRSD:         92                                     

QT:           358                                    

QTc:          464                                    

Axis:                                                

P:            25                                     

TN:           158                                    

QRS:          72                                     

T:            40                                     

                                                     

INTERPRETIVE STATEMENTS:                                       

                                                     

Sinus tachycardia

Otherwise normal ECG

Compared to ECG 12/20/2018 09:08:55

Sinus rhythm no longer present



Electronically Signed On 06-08-19 09:00:46 CDT by Alexander Mane